# Patient Record
Sex: FEMALE | Race: WHITE | NOT HISPANIC OR LATINO
[De-identification: names, ages, dates, MRNs, and addresses within clinical notes are randomized per-mention and may not be internally consistent; named-entity substitution may affect disease eponyms.]

---

## 2017-07-01 ENCOUNTER — RESULT REVIEW (OUTPATIENT)
Age: 67
End: 2017-07-01

## 2024-01-24 ENCOUNTER — APPOINTMENT (OUTPATIENT)
Dept: UROGYNECOLOGY | Facility: CLINIC | Age: 74
End: 2024-01-24
Payer: MEDICARE

## 2024-01-24 DIAGNOSIS — R35.0 FREQUENCY OF MICTURITION: ICD-10-CM

## 2024-01-24 DIAGNOSIS — I10 ESSENTIAL (PRIMARY) HYPERTENSION: ICD-10-CM

## 2024-01-24 DIAGNOSIS — E78.5 HYPERLIPIDEMIA, UNSPECIFIED: ICD-10-CM

## 2024-01-24 DIAGNOSIS — R33.9 RETENTION OF URINE, UNSPECIFIED: ICD-10-CM

## 2024-01-24 DIAGNOSIS — N39.41 URGE INCONTINENCE: ICD-10-CM

## 2024-01-24 DIAGNOSIS — N81.10 CYSTOCELE, UNSPECIFIED: ICD-10-CM

## 2024-01-24 PROBLEM — Z00.00 ENCOUNTER FOR PREVENTIVE HEALTH EXAMINATION: Status: ACTIVE | Noted: 2024-01-24

## 2024-01-24 LAB
BILIRUB UR QL STRIP: NORMAL
CLARITY UR: NORMAL
COLLECTION METHOD: NORMAL
GLUCOSE UR-MCNC: NORMAL
HCG UR QL: 0.2 EU/DL
HGB UR QL STRIP.AUTO: NORMAL
KETONES UR-MCNC: NORMAL
LEUKOCYTE ESTERASE UR QL STRIP: NORMAL
NITRITE UR QL STRIP: POSITIVE
PH UR STRIP: 6.5
PROT UR STRIP-MCNC: NORMAL
SP GR UR STRIP: 1.02

## 2024-01-24 PROCEDURE — 51701 INSERT BLADDER CATHETER: CPT

## 2024-01-24 PROCEDURE — 99204 OFFICE O/P NEW MOD 45 MIN: CPT | Mod: 25

## 2024-01-24 RX ORDER — VITS A,C,E/LUTEIN/MINERALS 300MCG-200
TABLET ORAL
Refills: 0 | Status: ACTIVE | COMMUNITY

## 2024-01-24 RX ORDER — ASPIRIN 81 MG
81 TABLET, DELAYED RELEASE (ENTERIC COATED) ORAL
Refills: 0 | Status: ACTIVE | COMMUNITY

## 2024-01-24 RX ORDER — MULTIVIT-MIN/FA/LYCOPEN/LUTEIN .4-300-25
TABLET ORAL
Refills: 0 | Status: ACTIVE | COMMUNITY

## 2024-01-24 RX ORDER — CALCIUM CARBONATE/VITAMIN D3 600 MG-20
TABLET ORAL
Refills: 0 | Status: ACTIVE | COMMUNITY

## 2024-01-24 RX ORDER — ATORVASTATIN CALCIUM 20 MG/1
20 TABLET, FILM COATED ORAL
Refills: 0 | Status: ACTIVE | COMMUNITY

## 2024-01-24 RX ORDER — LISINOPRIL AND HYDROCHLOROTHIAZIDE TABLETS 10; 12.5 MG/1; MG/1
10-12.5 TABLET ORAL
Refills: 0 | Status: ACTIVE | COMMUNITY

## 2024-01-24 NOTE — HISTORY OF PRESENT ILLNESS
[FreeTextEntry1] : SANDRITA GRACE is a 73-year-old P2 presenting for evaluation of vaginal bulge, urinary urgency/frequency and urgency incontinence. Denies stress incontinence, just feels increased urgency when she sneezes or coughs. Has a h/o a "bladder lift" with mesh in  at a hospital in [place]. She is bothered by vaginal bulge, which she feels most when she is standing on her feet for long periods of time. Reports sensation of incomplete emptying. She denies any issues with bowel movements. She has never had a colonoscopy/colon cancer screening.   Daytime voids: 6-10  Nighttime voids: 4-5 (sometimes up to 10 times)  Her fluid intake includes: 4-5 cups of coffee (last in the evening), 3 cups of water, rare ginger ale or orange juice.   PMH: HTN, HLD PSH: hysterectomy? bladder lift with mesh? cholecystectomy OBGYN Hx:  x 2 Social Hx: non-smoker

## 2024-01-24 NOTE — DISCUSSION/SUMMARY
[FreeTextEntry1] : I counseled Corina on the risk factors and etiologies of pelvic organ prolapse. Images were used to demonstrate the degree of prolapse. We reviewed management options, which included continued observation, Kegels and/or pelvic floor physical therapy, pessary, and surgery. We reviewed that based on her exam with predominant rectocele, surgical approach would involve and vaginal posterior wall repair +/- apical suspension with native tissue versus graft augmented via abdominal route. Pt is sexually active and is interested in surgical treatment. We reviewed need for urodynamic testing, colon cancer screening with colonoscopy, and to obtain records of previous reconstructive surgery.  IUGA patient information given on posterior colporrhaphy, SSLF, SPCX, MUS. She will follow-up after the above is completed.

## 2024-01-24 NOTE — PHYSICAL EXAM
[Chaperone Present] : A chaperone was present in the examining room during all aspects of the physical examination [FreeTextEntry1] : General: Well, appearing, no acute distress HEENT: Normocephalic, atraumatic Respiratory: Speaking in full sentences comfortably, normal work of breathing and no cough during visit Extremities: No upper extremity edema noted Skin: No obvious rash or skin lesions Neuro: Alert and oriented x 3, speech is fluent, normal rate Psych: Normal mood and affect [Normal Appearance] : general appearance was normal [Atrophy] : atrophy [Normal] : was normal [Aa ____] : Aa [unfilled] [Ba ____] : Ba [unfilled] [C ____] : C [unfilled] [GH ____] : GH [unfilled] [PB ____] : PB [unfilled] [TVL ____] : TVL  [unfilled] [Ap ____] : Ap [unfilled] [Bp ____] : Bp [unfilled] [Absent] : absent [FreeTextEntry3] : +urethral polyp, no hypermobility

## 2024-01-24 NOTE — PROCEDURE
[FreeTextEntry1] : A sterile straight catheterization was performed to rule out a urinary tract infection and measure postvoid residual volume, which was 120 ml. Urine was cloudy and dipstick was positive for nitrites/blood.

## 2024-01-25 DIAGNOSIS — Z86.79 PERSONAL HISTORY OF OTHER DISEASES OF THE CIRCULATORY SYSTEM: ICD-10-CM

## 2024-01-25 DIAGNOSIS — Z86.39 PERSONAL HISTORY OF OTHER ENDOCRINE, NUTRITIONAL AND METABOLIC DISEASE: ICD-10-CM

## 2024-01-25 DIAGNOSIS — Z78.9 OTHER SPECIFIED HEALTH STATUS: ICD-10-CM

## 2024-01-25 DIAGNOSIS — Z82.49 FAMILY HISTORY OF ISCHEMIC HEART DISEASE AND OTHER DISEASES OF THE CIRCULATORY SYSTEM: ICD-10-CM

## 2024-01-25 DIAGNOSIS — Z83.42 FAMILY HISTORY OF FAMILIAL HYPERCHOLESTEROLEMIA: ICD-10-CM

## 2024-01-26 LAB — BACTERIA UR CULT: NORMAL

## 2024-01-29 LAB
APPEARANCE: CLEAR
BACTERIA: ABNORMAL /HPF
BILIRUBIN URINE: NEGATIVE
BLOOD URINE: NEGATIVE
CAST: 1 /LPF
COLOR: YELLOW
EPITHELIAL CELLS: 0 /HPF
GLUCOSE QUALITATIVE U: NEGATIVE MG/DL
KETONES URINE: NEGATIVE MG/DL
LEUKOCYTE ESTERASE URINE: ABNORMAL
MICROSCOPIC-UA: NORMAL
NITRITE URINE: POSITIVE
PH URINE: 6.5
PROTEIN URINE: NEGATIVE MG/DL
RED BLOOD CELLS URINE: 1 /HPF
REVIEW: NORMAL
SPECIFIC GRAVITY URINE: 1.02
UROBILINOGEN URINE: 0.2 MG/DL
WHITE BLOOD CELLS URINE: 2 /HPF

## 2024-02-28 ENCOUNTER — APPOINTMENT (OUTPATIENT)
Dept: UROGYNECOLOGY | Facility: CLINIC | Age: 74
End: 2024-02-28
Payer: MEDICARE

## 2024-02-28 ENCOUNTER — APPOINTMENT (OUTPATIENT)
Dept: UROGYNECOLOGY | Facility: CLINIC | Age: 74
End: 2024-02-28

## 2024-02-28 DIAGNOSIS — N30.00 ACUTE CYSTITIS W/OUT HEMATURIA: ICD-10-CM

## 2024-02-28 LAB
BILIRUB UR QL STRIP: NORMAL
CLARITY UR: CLEAR
COLLECTION METHOD: NORMAL
GLUCOSE UR-MCNC: NORMAL
HCG UR QL: 0.2 EU/DL
HGB UR QL STRIP.AUTO: NORMAL
KETONES UR-MCNC: NORMAL
LEUKOCYTE ESTERASE UR QL STRIP: NORMAL
NITRITE UR QL STRIP: POSITIVE
PH UR STRIP: 5.5
PROT UR STRIP-MCNC: NORMAL
SP GR UR STRIP: 1.01

## 2024-02-28 PROCEDURE — 99214 OFFICE O/P EST MOD 30 MIN: CPT | Mod: 25

## 2024-02-28 PROCEDURE — 51701 INSERT BLADDER CATHETER: CPT

## 2024-02-28 PROCEDURE — 81003 URINALYSIS AUTO W/O SCOPE: CPT | Mod: QW

## 2024-02-28 RX ORDER — NITROFURANTOIN (MONOHYDRATE/MACROCRYSTALS) 25; 75 MG/1; MG/1
100 CAPSULE ORAL TWICE DAILY
Qty: 10 | Refills: 0 | Status: ACTIVE | COMMUNITY
Start: 2024-02-28 | End: 1900-01-01

## 2024-02-28 NOTE — HISTORY OF PRESENT ILLNESS
[FreeTextEntry1] : Corina is a 74 y/o that presents to the office for UDS. She reports malodorous and cloudy urine. She denies any urinary frequency, urgency or dysuria. She denies any fever, chills or back pain.

## 2024-02-28 NOTE — PROCEDURE
[Straight Catheterization] : insertion of a straight catheter [Other ___] : [unfilled] [Patient] : the patient [Allergies Reviewed] : Allergies reviewed [___ Fr Straight Tip] : a [unfilled] in [place] straight tip catheter [None] : there were no complications with the catheter insertion [Cloudy] : cloudy [Urinalysis] : urinalysis [Culture] : culture [No Complications] : no complications [Tolerated Well] : the patient tolerated the procedure well [Post procedure instructions and information given] : Post procedure instructions and information were given and reviewed with patient.

## 2024-02-28 NOTE — DISCUSSION/SUMMARY
[FreeTextEntry1] : Acute cystitis: Udip shows trace blood, + nitrites and small leuks UA, CS sent. Empirically treated with Nitrofurantoin x 5 days. Advised patient to call the office should symptoms worsen. Will follow up 3/22/24 for UDS.

## 2024-02-28 NOTE — PHYSICAL EXAM
[No Acute Distress] : in no acute distress [Well developed] : well developed [Well Nourished] : ~L well nourished [Good Hygeine] : demonstrates good hygeine [Oriented x3] : oriented to person, place, and time [Normal Memory] : ~T memory was ~L unimpaired [Normal Mood/Affect] : mood and affect are normal [None] : no CVA tenderness [Warm and Dry] : was warm and dry to touch [Normal Gait] : gait was normal [Vulvar Atrophy] : vulvar atrophy [Labia Majora] : were normal [Normal] : was normal [Atrophy] : atrophy [No Bleeding] : there was no active vaginal bleeding [Post Void Residual ____ml] : post void residual was [unfilled] ml [Anxiety] : patient is not anxious [Distended] : not distended [Tenderness] : ~T no ~M abdominal tenderness observed

## 2024-03-08 LAB
APPEARANCE: ABNORMAL
BACTERIA: ABNORMAL /HPF
BILIRUBIN URINE: NEGATIVE
BLOOD URINE: NEGATIVE
CAST: 0 /LPF
COLOR: YELLOW
EPITHELIAL CELLS: 1 /HPF
GLUCOSE QUALITATIVE U: NEGATIVE MG/DL
KETONES URINE: NEGATIVE MG/DL
LEUKOCYTE ESTERASE URINE: ABNORMAL
MICROSCOPIC-UA: NORMAL
NITRITE URINE: POSITIVE
PH URINE: 6
PROTEIN URINE: NEGATIVE MG/DL
RED BLOOD CELLS URINE: 1 /HPF
SPECIFIC GRAVITY URINE: 1.01
UROBILINOGEN URINE: 0.2 MG/DL
WHITE BLOOD CELLS URINE: 7 /HPF

## 2024-03-22 ENCOUNTER — APPOINTMENT (OUTPATIENT)
Dept: UROGYNECOLOGY | Facility: CLINIC | Age: 74
End: 2024-03-22
Payer: MEDICARE

## 2024-03-22 ENCOUNTER — OUTPATIENT (OUTPATIENT)
Dept: OUTPATIENT SERVICES | Facility: HOSPITAL | Age: 74
LOS: 1 days | End: 2024-03-22
Payer: MEDICARE

## 2024-03-22 DIAGNOSIS — N36.42 INTRINSIC SPHINCTER DEFICIENCY (ISD): ICD-10-CM

## 2024-03-22 DIAGNOSIS — N39.3 STRESS INCONTINENCE (FEMALE) (MALE): ICD-10-CM

## 2024-03-22 DIAGNOSIS — Z01.818 ENCOUNTER FOR OTHER PREPROCEDURAL EXAMINATION: ICD-10-CM

## 2024-03-22 PROCEDURE — 51797 INTRAABDOMINAL PRESSURE TEST: CPT | Mod: 26

## 2024-03-22 PROCEDURE — 51784 ANAL/URINARY MUSCLE STUDY: CPT

## 2024-03-22 PROCEDURE — 51729 CYSTOMETROGRAM W/VP&UP: CPT

## 2024-03-22 PROCEDURE — 51784 ANAL/URINARY MUSCLE STUDY: CPT | Mod: 26

## 2024-03-22 PROCEDURE — 51797 INTRAABDOMINAL PRESSURE TEST: CPT

## 2024-03-22 PROCEDURE — 51729 CYSTOMETROGRAM W/VP&UP: CPT | Mod: 26

## 2024-03-27 PROBLEM — N39.3 STRESS INCONTINENCE, FEMALE: Status: ACTIVE | Noted: 2024-03-27

## 2024-03-28 PROBLEM — N36.42 INTRINSIC SPHINCTER DEFICIENCY: Status: ACTIVE | Noted: 2024-03-28

## 2024-04-03 ENCOUNTER — APPOINTMENT (OUTPATIENT)
Dept: UROGYNECOLOGY | Facility: CLINIC | Age: 74
End: 2024-04-03
Payer: MEDICARE

## 2024-04-03 VITALS
HEART RATE: 69 BPM | HEIGHT: 64 IN | BODY MASS INDEX: 25.78 KG/M2 | DIASTOLIC BLOOD PRESSURE: 92 MMHG | SYSTOLIC BLOOD PRESSURE: 165 MMHG | WEIGHT: 151 LBS

## 2024-04-03 DIAGNOSIS — R39.15 URGENCY OF URINATION: ICD-10-CM

## 2024-04-03 DIAGNOSIS — N32.81 OVERACTIVE BLADDER: ICD-10-CM

## 2024-04-03 PROCEDURE — 99214 OFFICE O/P EST MOD 30 MIN: CPT

## 2024-04-03 NOTE — DISCUSSION/SUMMARY
[FreeTextEntry1] : We reviewed previous findings as well as urodynamic testing results. 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 We reviewed the mechanisms and possible etiologies of these findings. Pt reports that she has noticed significant improvement in urgency symptoms since decreasing her caffeine intake and is happy with changes. We reviewed alternative or supplemental management options, including bladder training/pelvic floor exercises (which can help improve bladder capacity), pharmacotherapy, and more advance treatments such as Botox injection and nerve stimulation. She would like to continue with conservative management along with bladder training and Kegels. I provided IUGA handouts on BT and PFE. We also discussed implications of occult KERA noted during UDS and reviewed management alternatives. She would prefer to defer treatment for now.   We reviewed management alternatives for prolapse again. I discussed that exam was notable for predominantly a posterior vaginal wall prolapse. Explained that there is possibility of more pronounced apical prolapse when exam is repeated under anesthesia. We discussed implications of this, and that decision needs to be made on whether to perform apical suspension as well. We reviewed surgical options for apical suspension again, including native tissue and graft augmentation. Corina would prefer an isolated posterior vaginal wall prolapse repair at this time. She understands there is a risk of recurrence or risk of prolapse in other compartment and would consider alternative treatment such as pessary in the future if there was recurrence. She would like to proceed with booking posterior colporrhaphy.

## 2024-04-03 NOTE — HISTORY OF PRESENT ILLNESS
[FreeTextEntry1] : Corina presents for follow of rectocele and overactive bladder. She desires surgical management of prolapse and underwent a urodynamic test. Results were notable for significant detrusor overactivity with low bladder capacity, occult KERA and possible ISD. Pt reports that she has cut back on caffeine intake, particularly in the afternoon/evening and has noticed significant improvement in urgency symptoms. She woke up only once to void last night.

## 2024-04-25 ENCOUNTER — OUTPATIENT (OUTPATIENT)
Dept: OUTPATIENT SERVICES | Facility: HOSPITAL | Age: 74
LOS: 1 days | End: 2024-04-25

## 2024-04-25 VITALS
DIASTOLIC BLOOD PRESSURE: 79 MMHG | HEART RATE: 83 BPM | TEMPERATURE: 98 F | WEIGHT: 151.9 LBS | SYSTOLIC BLOOD PRESSURE: 147 MMHG | HEIGHT: 63 IN | OXYGEN SATURATION: 99 % | RESPIRATION RATE: 16 BRPM

## 2024-04-25 DIAGNOSIS — N81.6 RECTOCELE: ICD-10-CM

## 2024-04-25 DIAGNOSIS — Z01.818 ENCOUNTER FOR OTHER PREPROCEDURAL EXAMINATION: ICD-10-CM

## 2024-04-25 DIAGNOSIS — Z90.49 ACQUIRED ABSENCE OF OTHER SPECIFIED PARTS OF DIGESTIVE TRACT: Chronic | ICD-10-CM

## 2024-04-25 DIAGNOSIS — Z98.890 OTHER SPECIFIED POSTPROCEDURAL STATES: Chronic | ICD-10-CM

## 2024-04-25 LAB
ANION GAP SERPL CALC-SCNC: 11 MMOL/L — SIGNIFICANT CHANGE UP (ref 5–17)
BLD GP AB SCN SERPL QL: NEGATIVE — SIGNIFICANT CHANGE UP
BUN SERPL-MCNC: 18 MG/DL — SIGNIFICANT CHANGE UP (ref 7–23)
CALCIUM SERPL-MCNC: 10.6 MG/DL — HIGH (ref 8.4–10.5)
CHLORIDE SERPL-SCNC: 106 MMOL/L — SIGNIFICANT CHANGE UP (ref 96–108)
CO2 SERPL-SCNC: 27 MMOL/L — SIGNIFICANT CHANGE UP (ref 22–31)
CREAT SERPL-MCNC: 0.71 MG/DL — SIGNIFICANT CHANGE UP (ref 0.5–1.3)
EGFR: 91 ML/MIN/1.73M2 — SIGNIFICANT CHANGE UP
GLUCOSE SERPL-MCNC: 91 MG/DL — SIGNIFICANT CHANGE UP (ref 70–99)
HCT VFR BLD CALC: 45.3 % — HIGH (ref 34.5–45)
HGB BLD-MCNC: 14.1 G/DL — SIGNIFICANT CHANGE UP (ref 11.5–15.5)
MCHC RBC-ENTMCNC: 28.8 PG — SIGNIFICANT CHANGE UP (ref 27–34)
MCHC RBC-ENTMCNC: 31.1 GM/DL — LOW (ref 32–36)
MCV RBC AUTO: 92.4 FL — SIGNIFICANT CHANGE UP (ref 80–100)
NRBC # BLD: 0 /100 WBCS — SIGNIFICANT CHANGE UP (ref 0–0)
PLATELET # BLD AUTO: 319 K/UL — SIGNIFICANT CHANGE UP (ref 150–400)
POTASSIUM SERPL-MCNC: 4.3 MMOL/L — SIGNIFICANT CHANGE UP (ref 3.5–5.3)
POTASSIUM SERPL-SCNC: 4.3 MMOL/L — SIGNIFICANT CHANGE UP (ref 3.5–5.3)
RBC # BLD: 4.9 M/UL — SIGNIFICANT CHANGE UP (ref 3.8–5.2)
RBC # FLD: 13.5 % — SIGNIFICANT CHANGE UP (ref 10.3–14.5)
RH IG SCN BLD-IMP: POSITIVE — SIGNIFICANT CHANGE UP
SODIUM SERPL-SCNC: 144 MMOL/L — SIGNIFICANT CHANGE UP (ref 135–145)
WBC # BLD: 4.96 K/UL — SIGNIFICANT CHANGE UP (ref 3.8–10.5)
WBC # FLD AUTO: 4.96 K/UL — SIGNIFICANT CHANGE UP (ref 3.8–10.5)

## 2024-04-25 PROCEDURE — 86900 BLOOD TYPING SEROLOGIC ABO: CPT

## 2024-04-25 PROCEDURE — 85027 COMPLETE CBC AUTOMATED: CPT

## 2024-04-25 PROCEDURE — G0463: CPT

## 2024-04-25 PROCEDURE — 80048 BASIC METABOLIC PNL TOTAL CA: CPT

## 2024-04-25 PROCEDURE — 86850 RBC ANTIBODY SCREEN: CPT

## 2024-04-25 PROCEDURE — 86901 BLOOD TYPING SEROLOGIC RH(D): CPT

## 2024-04-25 RX ORDER — SODIUM CHLORIDE 9 MG/ML
1000 INJECTION, SOLUTION INTRAVENOUS
Refills: 0 | Status: DISCONTINUED | OUTPATIENT
Start: 2024-05-16 | End: 2024-05-30

## 2024-04-25 NOTE — H&P PST ADULT - LAST ECHOCARDIOGRAM
Denies  ( reports seen cardiologist inpatient 2022 at the time of cholecystectomy but denies having stress test or ECHO )

## 2024-04-25 NOTE — H&P PST ADULT - PRIMARY CARE PROVIDER
Dr. Twan Pickard [phone] ( will see Dr. Juju Cruz ( same practice ) on 5/1/2024  for preop eval evaluation )

## 2024-04-25 NOTE — H&P PST ADULT - PROBLEM SELECTOR PLAN 1
Posterior colporrhapy repair   possible enterocele repair   PST instructions provided, patient verbalized understanding   CBc, BMP, T&S collected and sent   urine cx will be done by surgeon on Monday 4/29/2024 ( patient has an appointment )   PCP ana 5/1/2024

## 2024-04-25 NOTE — H&P PST ADULT - NSICDXPASTSURGICALHX_GEN_ALL_CORE_FT
PAST SURGICAL HISTORY:  H/O bladder repair surgery     History of cholecystectomy     S/P colonoscopic polypectomy

## 2024-04-25 NOTE — H&P PST ADULT - HISTORY OF PRESENT ILLNESS
73 yr old F with HTN, HLD , bladder lift 2010 , cholecystectomy 2022 and recent colonoscopy with polypectomy 3/6/2024 . Patient reports worsening rectocele , denies pain or discharge. Scheduled for urine cx with surgeon on Monday 4/29/2024. Presents to PST for scheduled posterior colporrhapy repair, possible enterocele repair on 5/16/2024. Denies fever, chills, no acute complaints. Ambulating without assistance.  73 yr old F with HTN, HLD, bladder lift 2010 , cholecystectomy 2022 and recent colonoscopy with polypectomy 3/6/2024 . Patient reports worsening rectocele , denies pain or discharge. Scheduled for urine cx with surgeon on Monday 4/29/2024. Presents to PST for scheduled posterior colporrhapy repair, possible enterocele repair on 5/16/2024. Denies fever, chills, no acute complaints. Ambulating without assistance.

## 2024-04-25 NOTE — H&P PST ADULT - ASSESSMENT
Airway : no airway abnormalities , denies prior anesthesia complications   Mallampati : II  Denies loose teeth /Has dentures     Khris abrasion risk : Denies

## 2024-04-25 NOTE — H&P PST ADULT - FUNCTIONAL STATUS
Discussed viral vs bacterial infection  Zithromax as ordered  Continue OTC cough/cold medications as directed  Call or return for problems/concerns DASI 7.25 on the feet daily, watching very active 2 yr old granddaughter, able to climb stairs . Denies symptoms./4-10 METS

## 2024-04-29 ENCOUNTER — APPOINTMENT (OUTPATIENT)
Dept: UROGYNECOLOGY | Facility: CLINIC | Age: 74
End: 2024-04-29
Payer: MEDICARE

## 2024-04-29 VITALS
HEART RATE: 57 BPM | BODY MASS INDEX: 26.29 KG/M2 | WEIGHT: 154 LBS | HEIGHT: 64 IN | DIASTOLIC BLOOD PRESSURE: 91 MMHG | SYSTOLIC BLOOD PRESSURE: 168 MMHG

## 2024-04-29 VITALS — HEART RATE: 56 BPM | DIASTOLIC BLOOD PRESSURE: 83 MMHG | SYSTOLIC BLOOD PRESSURE: 164 MMHG

## 2024-04-29 PROCEDURE — 99214 OFFICE O/P EST MOD 30 MIN: CPT

## 2024-04-29 PROCEDURE — 99459 PELVIC EXAMINATION: CPT

## 2024-04-29 NOTE — PHYSICAL EXAM
[Chaperone Present] : A chaperone was present in the examining room during all aspects of the physical examination [[id number]] : A chaperone was present during the pelvic exam. [Normal Appearance] : general appearance was normal [Atrophy] : atrophy [Normal] : was normal [Aa ____] : Aa [unfilled] [Ba ____] : Ba [unfilled] [C ____] : C [unfilled] [GH ____] : GH [unfilled] [PB ____] : PB [unfilled] [TVL ____] : TVL  [unfilled] [Ap ____] : Ap [unfilled] [Bp ____] : Bp [unfilled] [Absent] : absent [FreeTextEntry1] : General: Well, appearing, no acute distress HEENT: Normocephalic, atraumatic Respiratory: Speaking in full sentences comfortably, normal work of breathing and no cough during visit Extremities: No upper extremity edema noted Skin: No obvious rash or skin lesions Neuro: Alert and oriented x 3, speech is fluent, normal rate Psych: Normal mood and affect [FreeTextEntry3] : +urethral polyp, no hypermobility

## 2024-04-29 NOTE — HISTORY OF PRESENT ILLNESS
[FreeTextEntry1] : Corina Boyce is a 73-year-old with overactive bladder and a rectocele, desiring surgical management for the prolapse. Pt has a history a "bladder lift" with mesh in  at a hospital in [place].  Her urodynamic testing was significant for DI with leakage, low bladder capacity, and stress urinary incontinence. At last visit we discussed these findings, and she reported improvement of her overactive bladder symptoms with behavioral modifications.   PMH: HTN, HLD PSH: hysterectomy? bladder lift with mesh? cholecystectomy OBGYN Hx:  x 2 Social Hx: non-smoker

## 2024-04-29 NOTE — DISCUSSION/SUMMARY
[FreeTextEntry1] : We reviewed management alternatives for prolapse again. We discussed that there is possibility of more pronounced apical prolapse when exam is repeated under anesthesia. Implications of this were previously discussed and Corina would prefer an isolated posterior vaginal wall prolapse repair at this time unless there is significant apical prolapse, in which case she agreed to a sacrospinous ligament fixation. She understands there is a risk of recurrence or risk of prolapse in other compartments and would consider alternative treatment such as pessary in the future if there was recurrence. We also discussed implications of occult KERA noted during UDS and reviewed management alternatives. She would prefer to defer treatment for now in light of her overactive bladder symptoms and significant DI and low bladder capacity on urodynamic testing.   Risks of procedure including but not limited to the following were discussed: Bleeding, hemorrhage, need for transfusion, infection, damage to surrounding organs (bladder, bowel, nerves, vessels), ureteral injury, pain, scar tissue formation, DVT/PE. We discussed the risk of recurrence of prolapse, urinary retention, need for discharge home with catheter, and possibility of needing additional surgery in the future. All questions were answered. She expressed understanding of the above and would like to proceed with the scheduled surgery. I provided pre-operative and post-operative instructions and counseling on expectations.

## 2024-05-04 RX ORDER — SULFAMETHOXAZOLE AND TRIMETHOPRIM 800; 160 MG/1; MG/1
800-160 TABLET ORAL TWICE DAILY
Qty: 6 | Refills: 0 | Status: ACTIVE | COMMUNITY
Start: 2024-05-04 | End: 1900-01-01

## 2024-05-15 ENCOUNTER — TRANSCRIPTION ENCOUNTER (OUTPATIENT)
Age: 74
End: 2024-05-15

## 2024-05-16 ENCOUNTER — TRANSCRIPTION ENCOUNTER (OUTPATIENT)
Age: 74
End: 2024-05-16

## 2024-05-16 ENCOUNTER — APPOINTMENT (OUTPATIENT)
Dept: UROGYNECOLOGY | Facility: HOSPITAL | Age: 74
End: 2024-05-16
Payer: MEDICARE

## 2024-05-16 ENCOUNTER — OUTPATIENT (OUTPATIENT)
Dept: OUTPATIENT SERVICES | Facility: HOSPITAL | Age: 74
LOS: 1 days | End: 2024-05-16
Payer: MEDICARE

## 2024-05-16 VITALS
TEMPERATURE: 97 F | HEIGHT: 63 IN | SYSTOLIC BLOOD PRESSURE: 138 MMHG | OXYGEN SATURATION: 99 % | RESPIRATION RATE: 16 BRPM | DIASTOLIC BLOOD PRESSURE: 83 MMHG | WEIGHT: 151.9 LBS | HEART RATE: 69 BPM

## 2024-05-16 VITALS
HEART RATE: 76 BPM | RESPIRATION RATE: 14 BRPM | OXYGEN SATURATION: 99 % | SYSTOLIC BLOOD PRESSURE: 128 MMHG | DIASTOLIC BLOOD PRESSURE: 62 MMHG

## 2024-05-16 DIAGNOSIS — Z98.890 OTHER SPECIFIED POSTPROCEDURAL STATES: Chronic | ICD-10-CM

## 2024-05-16 DIAGNOSIS — N81.6 RECTOCELE: ICD-10-CM

## 2024-05-16 DIAGNOSIS — Z01.818 ENCOUNTER FOR OTHER PREPROCEDURAL EXAMINATION: ICD-10-CM

## 2024-05-16 DIAGNOSIS — Z90.49 ACQUIRED ABSENCE OF OTHER SPECIFIED PARTS OF DIGESTIVE TRACT: Chronic | ICD-10-CM

## 2024-05-16 LAB — RH IG SCN BLD-IMP: POSITIVE — SIGNIFICANT CHANGE UP

## 2024-05-16 PROCEDURE — 57250 REPAIR RECTUM & VAGINA: CPT

## 2024-05-16 PROCEDURE — C1889: CPT

## 2024-05-16 DEVICE — SURGIFLO MATRIX WITH THROMBIN KIT: Type: IMPLANTABLE DEVICE | Status: FUNCTIONAL

## 2024-05-16 RX ORDER — FENTANYL CITRATE 50 UG/ML
25 INJECTION INTRAVENOUS
Refills: 0 | Status: DISCONTINUED | OUTPATIENT
Start: 2024-05-16 | End: 2024-05-16

## 2024-05-16 RX ORDER — OXYCODONE HYDROCHLORIDE 5 MG/1
5 TABLET ORAL ONCE
Refills: 0 | Status: DISCONTINUED | OUTPATIENT
Start: 2024-05-16 | End: 2024-05-16

## 2024-05-16 RX ORDER — CEFAZOLIN SODIUM 1 G
2000 VIAL (EA) INJECTION ONCE
Refills: 0 | Status: COMPLETED | OUTPATIENT
Start: 2024-05-16 | End: 2024-05-16

## 2024-05-16 RX ORDER — MULTIVIT-MIN/FERROUS GLUCONATE 9 MG/15 ML
1 LIQUID (ML) ORAL
Refills: 0 | DISCHARGE

## 2024-05-16 RX ORDER — DIAZEPAM 5 MG
1 TABLET ORAL
Qty: 5 | Refills: 0
Start: 2024-05-16

## 2024-05-16 RX ORDER — LISINOPRIL/HYDROCHLOROTHIAZIDE 10-12.5 MG
1 TABLET ORAL
Refills: 0 | DISCHARGE

## 2024-05-16 RX ORDER — ONDANSETRON 8 MG/1
4 TABLET, FILM COATED ORAL ONCE
Refills: 0 | Status: DISCONTINUED | OUTPATIENT
Start: 2024-05-16 | End: 2024-05-30

## 2024-05-16 RX ORDER — ATORVASTATIN CALCIUM 80 MG/1
1 TABLET, FILM COATED ORAL
Refills: 0 | DISCHARGE

## 2024-05-16 RX ORDER — LIDOCAINE HCL 20 MG/ML
0.2 VIAL (ML) INJECTION ONCE
Refills: 0 | Status: COMPLETED | OUTPATIENT
Start: 2024-05-16 | End: 2024-05-16

## 2024-05-16 RX ADMIN — OXYCODONE HYDROCHLORIDE 5 MILLIGRAM(S): 5 TABLET ORAL at 16:24

## 2024-05-16 RX ADMIN — FENTANYL CITRATE 25 MICROGRAM(S): 50 INJECTION INTRAVENOUS at 15:44

## 2024-05-16 RX ADMIN — SODIUM CHLORIDE 100 MILLILITER(S): 9 INJECTION, SOLUTION INTRAVENOUS at 12:04

## 2024-05-16 RX ADMIN — FENTANYL CITRATE 25 MICROGRAM(S): 50 INJECTION INTRAVENOUS at 16:36

## 2024-05-16 RX ADMIN — FENTANYL CITRATE 25 MICROGRAM(S): 50 INJECTION INTRAVENOUS at 16:00

## 2024-05-16 NOTE — CHART NOTE - NSCHARTNOTEFT_GEN_A_CORE
Gynecology Chart Note    Pt eval at bedside for trial of void @ 1751. Pain controlled and patient has tolerated PO    Vaginal packing x1 removed (~20-50% saturated). Bladder fully drained through pierce catheter. Retrograde filled with 300cc sterile water then clamped.    Pierce removed. Patient assisted to restroom. Patient was given time to void and voided 100cc. Patient felt as though she completely emptied and did not have any further urge to void    Patient to be discharged home with a pierce catheter. A 18Fr Pierce catheter was placed and the PACU RNs will provided leg bag teaching prior to discharge. Patient and partner were instructed to call the office tomorrow to schedule a trial of void. Additionally, they were given bleeding precautions. Reviewed that some degree of bleeding is expected and that based off her vaginal packing, the amount of bleeding seen was reassuring     Maximilian Nails, PGY-2  Obstetrics and Gynecology    d/w Dr. SUHA Dey, [facility] fellow

## 2024-05-16 NOTE — ASU DISCHARGE PLAN (ADULT/PEDIATRIC) - CARE PROVIDER_API CALL
Adriana Johnson  Urogyn and Reconst Pelvic Surg  [address]  [place] [address]  Phone: [phone]  Fax: [phone]  Scheduled Appointment: 05/30/2024 10:30 AM

## 2024-05-16 NOTE — ASU DISCHARGE PLAN (ADULT/PEDIATRIC) - ASU DC SPECIAL INSTRUCTIONSFT
Bowel regimen  To avoid constipation and straining, we suggest the following (simultaneously):  1. Colace (stool softener) 100mg, one pill three times a day (breakfast, lunch, dinner). If stool is too soft, decrease to twice a day (breakfast, dinner).   2. If still constipated, you can add: Miralax once at bedtime.   All of these agents can be obtained over the counter at the pharmacy.      Pain control  For pain control, take the followin. Motrin 800mg three times a day, take with food  2. Add Tylenol as needed if still have pain despite Motrin   Motrin and Tylenol can be obtained over the counter.  3. Please take Valium 5mg BID as needed for pain. A prescription has been sent to the pharmacy.      Postoperative restrictions  Nothing in the vagina (tampons, sexual intercourse), No tub baths, pools or hot tubs for 6 weeks (showers are ok!). No lifting anything heavier than 15 lbs, no strenuous exercise for 2 weeks after surgery. Do not pull or cut any stitches that you see around your incision.      Vaginal bleeding  Spotting and intermittent passage of blood clots per vagina is normal in first few weeks after surgery. If you are soaking 1 pad per hour, that is not normal and you should notify my office and seek medical attention right away.      Vaginal discharge  Vaginal discharge (all colors) is normal after vaginal surgery. If you’ve had vaginal surgery, you have sutures in your vagina which take 3 months to fully absorb. You may have vaginal discharge during this time. This is normal.

## 2024-05-16 NOTE — ASU DISCHARGE PLAN (ADULT/PEDIATRIC) - NURSING INSTRUCTIONS
OK to take Tylenol/Acetaminophen at 8pm for pain and every 6 hours after as needed. OK to take Motrin/Ibuprofen  for pain and every 6 hours after as needed.

## 2024-05-17 ENCOUNTER — NON-APPOINTMENT (OUTPATIENT)
Age: 74
End: 2024-05-17

## 2024-05-17 PROBLEM — E78.5 HYPERLIPIDEMIA, UNSPECIFIED: Chronic | Status: ACTIVE | Noted: 2024-04-25

## 2024-05-17 PROBLEM — N81.6 RECTOCELE: Chronic | Status: ACTIVE | Noted: 2024-04-25

## 2024-05-17 PROBLEM — I10 ESSENTIAL (PRIMARY) HYPERTENSION: Chronic | Status: ACTIVE | Noted: 2024-04-25

## 2024-05-17 PROBLEM — N32.81 OVERACTIVE BLADDER: Chronic | Status: ACTIVE | Noted: 2024-04-25

## 2024-05-20 ENCOUNTER — APPOINTMENT (OUTPATIENT)
Dept: UROGYNECOLOGY | Facility: CLINIC | Age: 74
End: 2024-05-20
Payer: MEDICARE

## 2024-05-20 PROCEDURE — 99024 POSTOP FOLLOW-UP VISIT: CPT

## 2024-05-20 NOTE — OBJECTIVE
[Voiding Trial] : Voiding trial was performed [Post Void Residual ____ ml] : Post Void Residual was [unfilled] ml [Soft and Nontender] : soft and nontender [Clean, Dry, Intact] : Clean, Dry, Intact [Good Support] : Good support [Healing well] : healing well [No Masses or Tenderness] : no masses or tenderness [FreeTextEntry3] : Filled to 200ml (pt has small bladder capacity and started leaking at 190ml as seen with URD study on 03/22/24); pt was able to void 150ml.

## 2024-05-20 NOTE — DISCUSSION/SUMMARY
[FreeTextEntry1] : Corina is a 74yo s/p posterior colporrhaphy on 05/16/24; doing well.   #Postop State - Pt was refilled to 200ml (pt has small bladder capacity and starts leaking at 190ml); pt was able to void 150ml.  Rahman catheter d/c'ed.  Discussed with pt that she should try to void every 2-3 hours while awake for bladder training and continue to hydrate well.  Discussed techniques to empty her bladder completely such as double voiding and leaning forward.  If pt is unable to void or feels that she is not emptying her bladder completely and feels uncomfortable, she should call our office.  Pt verbalizes understanding.  - Reviewed postoperative instructions and restrictions  - Pt doing well; understands she needs to call for any issues or present to ER for any acute changes - Avoid anything inside vagina including tampon and sexual intercourse for 6 weeks post-op.  Avoid lifting anything heavier than 10 pounds for 6 weeks post-op.  Continue to avoid constipation using miralax/colace PRN.  RTO on 05/30/24 for post-op follow-up.  All questions answered to pt satisfaction.  Pt knows to call the office for any questions or concerns.

## 2024-05-20 NOTE — SUBJECTIVE
[FreeTextEntry1] : Overall pt is doing well and w/o complaint. Denies any fevers/chills. Reports some spotting on her pad which is improving; discussed that this is normal since this is POD 4.  [FreeTextEntry8] : Controlled with PRN Tylenol and Motrin.  [FreeTextEntry7] : Doing well with PRN analgesia.  Rahman catheter bothersome and giving her discomfort.  [FreeTextEntry6] : No N/V.  [FreeTextEntry5] : Urine draining from pierce catheter w/o issues; clear, yellow and no foul odor. Denies s/s of UTI.  [FreeTextEntry4] : Continues to use Colace PRN. [FreeTextEntry9] : Pt presents for TOV after urinary retention and pierce catheter insertion.  [FreeTextEntry3] : No issues.

## 2024-05-30 ENCOUNTER — APPOINTMENT (OUTPATIENT)
Dept: UROGYNECOLOGY | Facility: CLINIC | Age: 74
End: 2024-05-30
Payer: MEDICARE

## 2024-05-30 PROCEDURE — 99024 POSTOP FOLLOW-UP VISIT: CPT

## 2024-05-30 NOTE — SUBJECTIVE
[FreeTextEntry1] : Doing well, denies any complaints [FreeTextEntry8] : None [FreeTextEntry7] : None [FreeTextEntry6] : Normal [FreeTextEntry5] : Normal, improved urgency/frequency and nocturia. Denies any incontinence. [FreeTextEntry4] : Normal

## 2024-05-30 NOTE — OBJECTIVE
[Voiding Trial] : No voiding trial was performed [Post Void Residual ____ ml] : Post Void Residual was [unfilled] ml [Clean, Dry, Intact] : Clean, Dry, Intact [Good Support] : Good support [Healing well] : healing well

## 2024-05-30 NOTE — DISCUSSION/SUMMARY
[FreeTextEntry1] : Corina is s/p posterior colporrhaphy. She is doing well post-op. S/p successful repeat TOV last week due to transient urinary retention. Unremarkable post-op exam today, healing well; routine post-op course. Discussed continued precautions, avoidance of heavy lifting. Follow-up for final post-op check in 4 weeks.

## 2024-06-27 ENCOUNTER — APPOINTMENT (OUTPATIENT)
Dept: UROGYNECOLOGY | Facility: CLINIC | Age: 74
End: 2024-06-27
Payer: MEDICARE

## 2024-06-27 VITALS
HEIGHT: 64 IN | DIASTOLIC BLOOD PRESSURE: 84 MMHG | BODY MASS INDEX: 25.78 KG/M2 | HEART RATE: 64 BPM | WEIGHT: 151 LBS | SYSTOLIC BLOOD PRESSURE: 156 MMHG

## 2024-06-27 DIAGNOSIS — N81.6 RECTOCELE: ICD-10-CM

## 2024-06-27 DIAGNOSIS — Z98.890 OTHER SPECIFIED POSTPROCEDURAL STATES: ICD-10-CM

## 2024-06-27 PROCEDURE — 99024 POSTOP FOLLOW-UP VISIT: CPT

## (undated) DEVICE — MEDICATION LABELS W MARKER

## (undated) DEVICE — PACK MINOR

## (undated) DEVICE — SYR LUER LOK 20CC

## (undated) DEVICE — TUBING SUCTION 20FT

## (undated) DEVICE — SOL IRR POUR NS 0.9% 500ML

## (undated) DEVICE — SUT MAXON 2-0 30" GS-22

## (undated) DEVICE — DRAPE INSTRUMENT POUCH 6.75" X 11"

## (undated) DEVICE — LONE STAR RETRACTOR RING 32.5CM X 18.3CM DISP

## (undated) DEVICE — SUT POLYSORB 0 30" GS-21 UNDYED

## (undated) DEVICE — FOLEY TRAY 16FR 5CC LTX UMETER CLOSED

## (undated) DEVICE — SUT VICRYL 2-0 27" CT-2

## (undated) DEVICE — TUBING TUR 2 PRONG

## (undated) DEVICE — SUT MAXON 2-0 27" T-5

## (undated) DEVICE — VENODYNE/SCD SLEEVE CALF LARGE

## (undated) DEVICE — SOL IRR BAG NS 0.9% 1000ML

## (undated) DEVICE — DRAPE LAVH 124" X 30" X125"

## (undated) DEVICE — LONE STAR ELASTIC STAY HOOK 12MM BLUNT

## (undated) DEVICE — DRAPE MAYO STAND 30"

## (undated) DEVICE — DRAPE TOWEL BLUE 17" X 24"

## (undated) DEVICE — SOL IRR POUR H2O 250ML

## (undated) DEVICE — GLV 6.5 PROTEXIS (WHITE)

## (undated) DEVICE — WARMING BLANKET UPPER ADULT

## (undated) DEVICE — SPECIMEN CONTAINER 100ML